# Patient Record
Sex: FEMALE | Race: WHITE | NOT HISPANIC OR LATINO | Employment: UNEMPLOYED | URBAN - NONMETROPOLITAN AREA
[De-identification: names, ages, dates, MRNs, and addresses within clinical notes are randomized per-mention and may not be internally consistent; named-entity substitution may affect disease eponyms.]

---

## 2017-08-19 ENCOUNTER — OFFICE VISIT (OUTPATIENT)
Dept: URGENT CARE | Facility: PHYSICIAN GROUP | Age: 37
End: 2017-08-19
Payer: COMMERCIAL

## 2017-08-19 VITALS
RESPIRATION RATE: 16 BRPM | WEIGHT: 131 LBS | BODY MASS INDEX: 20.56 KG/M2 | SYSTOLIC BLOOD PRESSURE: 102 MMHG | HEART RATE: 64 BPM | HEIGHT: 67 IN | TEMPERATURE: 97.9 F | DIASTOLIC BLOOD PRESSURE: 64 MMHG | OXYGEN SATURATION: 98 %

## 2017-08-19 DIAGNOSIS — R05.9 COUGH: ICD-10-CM

## 2017-08-19 DIAGNOSIS — R10.9 RIGHT FLANK PAIN: ICD-10-CM

## 2017-08-19 DIAGNOSIS — B96.89 ACUTE BACTERIAL SINUSITIS: ICD-10-CM

## 2017-08-19 DIAGNOSIS — R06.2 WHEEZING: ICD-10-CM

## 2017-08-19 DIAGNOSIS — J01.90 ACUTE BACTERIAL SINUSITIS: ICD-10-CM

## 2017-08-19 PROCEDURE — 99204 OFFICE O/P NEW MOD 45 MIN: CPT | Performed by: FAMILY MEDICINE

## 2017-08-19 RX ORDER — PREDNISONE 20 MG/1
TABLET ORAL
Qty: 10 TAB | Refills: 0 | Status: SHIPPED | OUTPATIENT
Start: 2017-08-19

## 2017-08-19 RX ORDER — LEVOFLOXACIN 500 MG/1
TABLET, FILM COATED ORAL
Qty: 10 TAB | Refills: 0 | Status: SHIPPED | OUTPATIENT
Start: 2017-08-19

## 2017-08-19 ASSESSMENT — PAIN SCALES - GENERAL: PAINLEVEL: 4=SLIGHT-MODERATE PAIN

## 2017-08-19 NOTE — MR AVS SNAPSHOT
"        Mikaela Jimenezalice   2017 9:05 AM   Office Visit   MRN: [id number]    Department:  [facility]   Dept Phone:  [phone]    Description:  Female : 1980   Provider:  Victorino Matt M.D.           Reason for Visit     Cough pain in her right side    Nasal Congestion           Allergies as of 2017     No Known Allergies      You were diagnosed with     Acute bacterial sinusitis   [934701]       Cough   [786.2.ICD-9-CM]       Wheezing   [786.07.ICD-9-CM]       Right flank pain   [749970]         Vital Signs     Blood Pressure Pulse Temperature Respirations Height Weight    102/64 mmHg 64 36.6 °C (97.9 °F) 16 1.702 m (5' 7\") 59.421 kg (131 lb)    Body Mass Index Oxygen Saturation Last Menstrual Period Smoking Status          20.51 kg/m2 98% 2017 Never Smoker         Basic Information     Date Of Birth Sex Race Ethnicity Preferred Language    1980 Female White Non- English      Problem List              ICD-10-CM Priority Class Noted - Resolved    Thyrotoxicosis E05.90   2012 - Present      Health Maintenance        Date Due Completion Dates    IMM DTaP/Tdap/Td Vaccine (1 - Tdap) 10/27/2000 ---    PAP SMEAR 10/27/2002 ---    IMM INFLUENZA (1) 2017 ---            Current Immunizations     MMR/Varicella Combined Vaccine  Incomplete    Tdap Vaccine  Incomplete      Below and/or attached are the medications your provider expects you to take. Review all of your home medications and newly ordered medications with your provider and/or pharmacist. Follow medication instructions as directed by your provider and/or pharmacist. Please keep your medication list with you and share with your provider. Update the information when medications are discontinued, doses are changed, or new medications (including over-the-counter products) are added; and carry medication information at all times in the event of emergency situations     Allergies:  No Known Allergies          "   Medications  Valid as of: August 19, 2017 - 10:04 AM    Generic Name Brand Name Tablet Size Instructions for use    Docusate Sodium (Cap)  MG Take 100 mg by mouth 2 times a day as needed for Constipation.        Ibuprofen (Tab) MOTRIN 800 MG Take 1 Tab by mouth every 8 hours as needed (Cramping).        LevoFLOXacin (Tab) LEVAQUIN 500 MG 1 TAB ONCE A DAY X 10 DAYS.        Oxycodone-Acetaminophen (Tab) PERCOCET 5-325 MG Take 1 Tab by mouth every four hours as needed for Moderate Pain ((Pain Scale 4-6)).        PredniSONE (Tab) DELTASONE 20 MG 1 TAB ONCE A DAY ONLY IF NEEDED FOR STUFFY NOSE OR PAIN. TAKE WITH FOOD.        .                 Medicines prescribed today were sent to:     [facility] [address] [place] [address] - [facility]    [address] [place] [id number]    Phone: [phone] Fax: [phone]    Open 24 Hours?: No      Medication refill instructions:       If your prescription bottle indicates you have medication refills left, it is not necessary to call your provider’s office. Please contact your pharmacy and they will refill your medication.    If your prescription bottle indicates you do not have any refills left, you may request refills at any time through one of the following ways: The online [employer] system (except Urgent Care), by calling your provider’s office, or by asking your pharmacy to contact your provider’s office with a refill request. Medication refills are processed only during regular business hours and may not be available until the next business day. Your provider may request additional information or to have a follow-up visit with you prior to refilling your medication.   *Please Note: Medication refills are assigned a new Rx number when refilled electronically. Your pharmacy may indicate that no refills were authorized even though a new prescription for the same medication is available at the pharmacy. Please request the medicine by name with the  pharmacy before contacting your provider for a refill.           [employer] Access Code: [email or web address]  Expires: 9/18/2017 10:04 AM    [employer]  A secure, online tool to manage your health information     [employer]’s K[employer] Dache® is a secure, online tool that connects you to your personalized health information from the privacy of your home -- day or night - making it very easy for you to manage your healthcare. Once the activation process is completed, you can even access your medical information using the K[employer] Dache mayank, which is available for free in the [employer] Mayank store or Google Play store.     [employer]ache provides the following levels of access (as shown below):   My Chart Features   [facility] Primary Care Doctor [facility]  Specialists [facility]  Urgent  Care Non-[facility]  Primary Care  Doctor   Email your healthcare team securely and privately 24/7 X X X    Manage appointments: schedule your next appointment; view details of past/upcoming appointments X      Request prescription refills. X      View recent personal medical records, including lab and immunizations X X X X   View health record, including health history, allergies, medications X X X X   Read reports about your outpatient visits, procedures, consult and ER notes X X X X   See your discharge summary, which is a recap of your hospital and/or ER visit that includes your diagnosis, lab results, and care plan. X X       How to register for [employer]:  1. Go to  [email or web address].  2. Click on the Sign Up Now box, which takes you to the New Member Sign Up page. You will need to provide the following information:  a. Enter your [employer] Access Code exactly as it appears at the top of this page. (You will not need to use this code after you’ve completed the sign-up process. If you do not sign up before the expiration date, you must request a new code.)   b. Enter your date of birth.   c. Enter your home email address.   d. Click Submit, and follow the next screen’s  instructions.  3. Create a [employer] ID. This will be your [employer] login ID and cannot be changed, so think of one that is secure and easy to remember.  4. Create a [employer] password. You can change your password at any time.  5. Enter your Password Reset Question and Answer. This can be used at a later time if you forget your password.   6. Enter your e-mail address. This allows you to receive e-mail notifications when new information is available in [employer].  7. Click Sign Up. You can now view your health information.    For assistance activating your [employer] account, call [phone]

## 2017-08-19 NOTE — PROGRESS NOTES
Chief Complaint:    Chief Complaint   Patient presents with   • Cough     pain in her right side   • Nasal Congestion       History of Present Illness:    This is a new problem. 1 month ago, started with cough. This has improved some but is still persisting. Now main problem is nasal congestion with purulent mucus from nose and discomfort in sinus regions which started about 10 days ago. At least moderate severity. Also recently has some pain in right flank region. No injury or trauma to this area. She thinks pain could be due to coughing. No fever or sore throat. No wheezing or shortness of breath. No h/o asthma or other chronic lung condition. Used OTC meds for symptoms.      Review of Systems:    Constitutional: Negative for fever, chills, and diaphoresis.   Eyes: Negative for change in vision, photophobia, pain, redness, and discharge.  ENT: See HPI.  Respiratory: See HPI.  Cardiovascular: Negative for chest pain, palpitations, orthopnea, claudication, leg swelling, and PND.   Gastrointestinal: Negative for abdominal pain, nausea, vomiting, diarrhea, constipation, blood in stool, and melena.   Genitourinary: Negative for dysuria, urinary urgency, urinary frequency, hematuria, and flank pain.   Musculoskeletal: See HPI.   Skin: Negative for rash and itching.   Neurological: Negative for dizziness, tingling, tremors, sensory change, speech change, focal weakness, seizures, loss of consciousness, and headaches.   Endo: Negative for polydipsia.   Heme: Does not bruise/bleed easily.   Psychiatric/Behavioral: Negative for depression, suicidal ideas, hallucinations, memory loss and substance abuse. The patient is not nervous/anxious and does not have insomnia.      Past Medical History:    Past Medical History   Diagnosis Date   • Thyrotoxicosis 2012     postpartum       Past Surgical History:    Past Surgical History   Procedure Laterality Date   • Primary c section       12/5/08 and 6/2/11   • Repeat c section   "5/26/2015     Procedure: REPEAT C SECTION;  Surgeon: Renny Lares M.D.;  Location: LABOR AND DELIVERY;  Service:        Social History:    Social History     Social History   • Marital Status:      Spouse Name: N/A   • Number of Children: N/A   • Years of Education: N/A     Occupational History   • Not on file.     Social History Main Topics   • Smoking status: Never Smoker    • Smokeless tobacco: Never Used   • Alcohol Use: No   • Drug Use: No   • Sexual Activity: Not on file     Other Topics Concern   • Not on file     Social History Narrative       Family History:    Family History   Problem Relation Age of Onset   • Cancer Mother        Medications:    No current outpatient prescriptions on file prior to visit.     No current facility-administered medications on file prior to visit.       Allergies:    No Known Allergies      Vitals:    Filed Vitals:    08/19/17 0936   BP: 102/64   Pulse: 64   Temp: 36.6 °C (97.9 °F)   Resp: 16   Height: 1.702 m (5' 7\")   Weight: 59.421 kg (131 lb)   SpO2: 98%       Physical Exam:    Constitutional: Vital signs reviewed. Appears well-developed and well-nourished. No acute distress.   Eyes: Sclera white, conjunctivae clear.   ENT: Moderate-severely erythematous nasal mucosa bilaterally. External ears normal. External auditory canals normal without discharge. TMs translucent and non-bulging. Hearing normal. Lips/teeth are normal. Oral mucosa pink and moist. Posterior pharynx: WNL.  Neck: Neck supple.   Cardiovascular: Regular rate and rhythm. No murmur.  Pulmonary/Chest: Respirations non-labored. Mild wheeze left lower lung, otherwise clear to auscultation bilaterally.  Abdomen: Bowel sounds are normal active. Soft, non-distended, and non-tender to palpation.  Lymph: Cervical nodes without tenderness or enlargement.  Musculoskeletal: Mildly tender to palpation right lower rib, laterally. Normal gait. Normal range of motion. No muscular atrophy or " weakness.  Neurological: Alert and oriented to person, place, and time. Muscle tone normal. Coordination normal.   Skin: No rashes or lesions. Warm, dry, normal turgor.  Psychiatric: Normal mood and affect. Behavior is normal. Judgment and thought content normal.       Assessment / Plan:    1. Acute bacterial sinusitis  - levofloxacin (LEVAQUIN) 500 MG tablet; 1 TAB ONCE A DAY X 10 DAYS.  Dispense: 10 Tab; Refill: 0  - predniSONE (DELTASONE) 20 MG Tab; 1 TAB ONCE A DAY ONLY IF NEEDED FOR STUFFY NOSE OR PAIN. TAKE WITH FOOD.  Dispense: 10 Tab; Refill: 0    2. Cough  - predniSONE (DELTASONE) 20 MG Tab; 1 TAB ONCE A DAY ONLY IF NEEDED FOR STUFFY NOSE OR PAIN. TAKE WITH FOOD.  Dispense: 10 Tab; Refill: 0    3. Wheezing  - predniSONE (DELTASONE) 20 MG Tab; 1 TAB ONCE A DAY ONLY IF NEEDED FOR STUFFY NOSE OR PAIN. TAKE WITH FOOD.  Dispense: 10 Tab; Refill: 0    4. Right flank pain  - predniSONE (DELTASONE) 20 MG Tab; 1 TAB ONCE A DAY ONLY IF NEEDED FOR STUFFY NOSE OR PAIN. TAKE WITH FOOD.  Dispense: 10 Tab; Refill: 0      Discussed with her DDX and management options.    Agreeable to medications prescribed.    Follow-up with PCP or urgent care if getting worse or not better with above.